# Patient Record
Sex: MALE | Race: WHITE | NOT HISPANIC OR LATINO | Employment: FULL TIME | URBAN - METROPOLITAN AREA
[De-identification: names, ages, dates, MRNs, and addresses within clinical notes are randomized per-mention and may not be internally consistent; named-entity substitution may affect disease eponyms.]

---

## 2022-03-22 ENCOUNTER — OFFICE VISIT (OUTPATIENT)
Dept: FAMILY MEDICINE CLINIC | Facility: CLINIC | Age: 32
End: 2022-03-22

## 2022-03-22 VITALS
SYSTOLIC BLOOD PRESSURE: 137 MMHG | WEIGHT: 283.6 LBS | HEIGHT: 73 IN | BODY MASS INDEX: 37.59 KG/M2 | HEART RATE: 83 BPM | TEMPERATURE: 95.7 F | OXYGEN SATURATION: 98 % | DIASTOLIC BLOOD PRESSURE: 88 MMHG

## 2022-03-22 DIAGNOSIS — Z00.00 ROUTINE GENERAL MEDICAL EXAMINATION AT A HEALTH CARE FACILITY: Primary | ICD-10-CM

## 2022-03-22 DIAGNOSIS — M25.512 ACUTE PAIN OF LEFT SHOULDER: ICD-10-CM

## 2022-03-22 DIAGNOSIS — I10 ESSENTIAL HYPERTENSION: ICD-10-CM

## 2022-03-22 PROBLEM — M72.2 PLANTAR FASCIITIS OF RIGHT FOOT: Status: ACTIVE | Noted: 2020-06-04

## 2022-03-22 PROCEDURE — 99385 PREV VISIT NEW AGE 18-39: CPT | Performed by: NURSE PRACTITIONER

## 2022-03-22 PROCEDURE — 99213 OFFICE O/P EST LOW 20 MIN: CPT | Performed by: NURSE PRACTITIONER

## 2022-03-22 RX ORDER — NIFEDIPINE 60 MG/1
TABLET, EXTENDED RELEASE ORAL
COMMUNITY
Start: 2022-03-12 | End: 2022-03-22 | Stop reason: SDUPTHER

## 2022-03-22 RX ORDER — CETIRIZINE HYDROCHLORIDE 10 MG/1
TABLET ORAL DAILY
COMMUNITY

## 2022-03-22 RX ORDER — CHLORAL HYDRATE 500 MG
2 CAPSULE ORAL
COMMUNITY

## 2022-03-22 RX ORDER — NIFEDIPINE 60 MG/1
60 TABLET, EXTENDED RELEASE ORAL DAILY
Qty: 90 TABLET | Refills: 1 | Status: SHIPPED | OUTPATIENT
Start: 2022-03-22 | End: 2022-10-17 | Stop reason: SDUPTHER

## 2022-03-22 RX ORDER — VALSARTAN 80 MG/1
80 TABLET ORAL DAILY
Qty: 90 TABLET | Refills: 1 | Status: SHIPPED | OUTPATIENT
Start: 2022-03-22 | End: 2022-09-28

## 2022-03-22 RX ORDER — VALSARTAN 80 MG/1
TABLET ORAL
COMMUNITY
Start: 2022-03-10 | End: 2022-03-22 | Stop reason: SDUPTHER

## 2022-03-22 NOTE — PROGRESS NOTES
Chief Complaint  Establish Care (New pt, Physical, L shoulder pain )    Subjective          Bahman Christina presents to [facility] PRIMARY [facility]  History of Present Illness new pt here to est care for physical, HTN, and acute left shoulder pain.      Moved to [place] from [place] where he grew up for college.  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  Also, played baseball-pitched as well as other positions.    Now he plays in baseball and basketball leagues for fun.    He is left handed and can throw a couple of innings before his left shoulder starts to hurt.  Does not hurt when not throwing, does not hurt unless he is throwing hard (not painful in warm ups).  He does not have decreased ROM.  Uses ice and 2 days of NSAIDs to alleviate pain.  Pain is located anterior shoulder.  No sports related injuries or surgeries.      Was dx with HTN around age 21 in college.  Had complete cardiology work up-no etiology.  Thinks echo showed possible LVH.  No real FH HTN.  No renal issues, thinks he may have had renal US but not sure.  Diuretic did not work.  Arb seems to be working, procardia added due to LVH.  Takes both at HS.  Does not think his BP is probably that well controlled.  Has cuff, not been checking recently.    Tried many lifestyle mods prior to meds, not helpful. Dropped weight which only decreased BP to appr 135/80.   Admits significant whitecoat HTN.  Denies side effects on medications.     Since moving here, he lives with GF.  They have 5 pets, no kids.  He works in customer service.      Trying to work on portion control to lose weight.  Does not eat much fast food.  Sleeps well.     Has FH T2DM, HLD, dad with Afib.  Denies breast, colon, ovarian, prostate cancer.  GF may have had  colon cancer.      Denies: No HA, chest pain, palpitations, cough, dyspnea, trouble swallowing, sore throat, ear or nose problems. Admits allergies which are new since moving here.  No abd pain,  "n/v/d/constipation, melena, reflux.  No urinary problems, hematuria.  Denies anxiety, depression, insomnia.     Objective   Vital Signs:   /88   Pulse 83   Temp 95.7 °F (35.4 °C)   Ht 185.4 cm (73\")   Wt 129 kg (283 lb 9.6 oz)   SpO2 98%   BMI 37.42 kg/m²     BMI is above normal parameters. Recommendations: educational material discussed/shared in after visit summary       Physical Exam  Vitals and nursing note reviewed.   Constitutional:       General: He is not in acute distress.     Appearance: He is well-developed. He is not ill-appearing.   HENT:      Head: Normocephalic and atraumatic.      Right Ear: Tympanic membrane, ear canal and external ear normal.      Left Ear: Tympanic membrane, ear canal and external ear normal.      Mouth/Throat:      Pharynx: Uvula midline.   Eyes:      General: No scleral icterus.        Right eye: No discharge.         Left eye: No discharge.      Conjunctiva/sclera: Conjunctivae normal.      Pupils: Pupils are equal, round, and reactive to light.   Neck:      Thyroid: No thyromegaly.   Cardiovascular:      Rate and Rhythm: Normal rate and regular rhythm.      Heart sounds: Normal heart sounds. No murmur heard.  Pulmonary:      Effort: Pulmonary effort is normal.      Breath sounds: Normal breath sounds.   Abdominal:      General: Bowel sounds are normal.      Palpations: Abdomen is soft.      Tenderness: There is no abdominal tenderness.   Musculoskeletal:         General: No deformity.      Left shoulder: No swelling, deformity, tenderness, bony tenderness or crepitus. Normal range of motion. Normal strength.      Cervical back: Neck supple.      Comments: Gait smooth and steady   Lymphadenopathy:      Cervical: No cervical adenopathy.   Skin:     General: Skin is warm and dry.   Neurological:      General: No focal deficit present.      Mental Status: He is alert and oriented to person, place, and time.   Psychiatric:         Attention and Perception: Attention and " perception normal.         Mood and Affect: Mood and affect normal.         Speech: Speech normal.         Behavior: Behavior normal. Behavior is cooperative.         Thought Content: Thought content normal.         Cognition and Memory: Cognition normal.      Comments: Very pleasant, conversant        Result Review :                 Assessment and Plan    Diagnoses and all orders for this visit:    1. Routine general medical examination at a health care facility (Primary)  -     CBC & Differential  -     Comprehensive Metabolic Panel  -     Hemoglobin A1c  -     Lipid Panel With LDL / HDL Ratio  -     Microalbumin / Creatinine Urine Ratio - Urine, Clean Catch  -     TSH Rfx On Abnormal To Free T4  -     Hepatitis C Antibody    2. Essential hypertension  -     CBC & Differential  -     Comprehensive Metabolic Panel  -     Microalbumin / Creatinine Urine Ratio - Urine, Clean Catch  -     TSH Rfx On Abnormal To Free T4  -     NIFEdipine XL (PROCARDIA XL) 60 MG 24 hr tablet; Take 1 tablet by mouth Daily.  Dispense: 90 tablet; Refill: 1  -     valsartan (DIOVAN) 80 MG tablet; Take 1 tablet by mouth Daily.  Dispense: 90 tablet; Refill: 1    3. Acute pain of left shoulder  -     Ambulatory Referral to Sports Medicine     As part of wellness and prevention, the following topics were discussed with the patient:   Nutrition-diet high in fresh/fozen veges, lower in carbs, unsaturated fats, and higher in lean proteins, adequate hydration   Physical activity/regular exercise-150 mins per week of moderate activity that increases HR and is enjoyable to lower stress and improve CVD     Healthy weight-above goal BMI     Injury prevention-back and joint health    Substance misuse/abuse-none identified    Sexual behavior-denies lack of libido, ED   Dental health-recommend twice per year dental cleans and daily flossing to lower inflammation in body   Mental health-stress mgmt and sleep hygiene   Immunization-recommend covid booster  vaccine and Tdap-pt thinks his Tdap is UTD    HTN:  BP is just a little above goal today.  Recommend periodic home monitoring.  On 2 BP meds so no reason to be high.  Will continue current meds today.  Will get urine micro for overall control in addition to regular labs.  I have requested [facility] to get previous records to make sure he has had complete w/u for HTN since he has no FH.  If not able to obtain records will need further work up.      Referral to sports medicine for left shoulder pain.  Activity important to keep BP controlled and stress mgmt.         Follow Up   Return in about 6 months (around 9/22/2022).  Patient was given instructions and counseling regarding his condition or for health maintenance advice. Please see specific information pulled into the AVS if appropriate.

## 2022-03-23 ENCOUNTER — OFFICE VISIT (OUTPATIENT)
Dept: SPORTS MEDICINE | Facility: CLINIC | Age: 32
End: 2022-03-23

## 2022-03-23 VITALS
HEART RATE: 83 BPM | TEMPERATURE: 97.8 F | DIASTOLIC BLOOD PRESSURE: 80 MMHG | BODY MASS INDEX: 37.51 KG/M2 | OXYGEN SATURATION: 98 % | WEIGHT: 283 LBS | RESPIRATION RATE: 16 BRPM | HEIGHT: 73 IN | SYSTOLIC BLOOD PRESSURE: 132 MMHG

## 2022-03-23 DIAGNOSIS — M25.512 CHRONIC LEFT SHOULDER PAIN: ICD-10-CM

## 2022-03-23 DIAGNOSIS — S43.432A LABRAL TEAR OF SHOULDER, LEFT, INITIAL ENCOUNTER: Primary | ICD-10-CM

## 2022-03-23 DIAGNOSIS — G89.29 CHRONIC LEFT SHOULDER PAIN: ICD-10-CM

## 2022-03-23 LAB
ALBUMIN SERPL-MCNC: 4.8 G/DL (ref 4.1–5.2)
ALBUMIN/CREAT UR: 3 MG/G CREAT (ref 0–29)
ALBUMIN/GLOB SERPL: 1.8 {RATIO} (ref 1.2–2.2)
ALP SERPL-CCNC: 86 IU/L (ref 44–121)
ALT SERPL-CCNC: 18 IU/L (ref 0–44)
AST SERPL-CCNC: 21 IU/L (ref 0–40)
BASOPHILS # BLD AUTO: 0.1 X10E3/UL (ref 0–0.2)
BASOPHILS NFR BLD AUTO: 1 %
BILIRUB SERPL-MCNC: 0.7 MG/DL (ref 0–1.2)
BUN SERPL-MCNC: 16 MG/DL (ref 6–20)
BUN/CREAT SERPL: 13 (ref 9–20)
CALCIUM SERPL-MCNC: 10.4 MG/DL (ref 8.7–10.2)
CHLORIDE SERPL-SCNC: 103 MMOL/L (ref 96–106)
CHOLEST SERPL-MCNC: 204 MG/DL (ref 100–199)
CO2 SERPL-SCNC: 26 MMOL/L (ref 20–29)
CREAT SERPL-MCNC: 1.28 MG/DL (ref 0.76–1.27)
CREAT UR-MCNC: 364.4 MG/DL
EGFRCR SERPLBLD CKD-EPI 2021: 78 ML/MIN/1.73
EOSINOPHIL # BLD AUTO: 0.2 X10E3/UL (ref 0–0.4)
EOSINOPHIL NFR BLD AUTO: 4 %
ERYTHROCYTE [DISTWIDTH] IN BLOOD BY AUTOMATED COUNT: 13.3 % (ref 11.6–15.4)
GLOBULIN SER CALC-MCNC: 2.7 G/DL (ref 1.5–4.5)
GLUCOSE SERPL-MCNC: 103 MG/DL (ref 65–99)
HBA1C MFR BLD: 5.5 % (ref 4.8–5.6)
HCT VFR BLD AUTO: 51.7 % (ref 37.5–51)
HCV AB S/CO SERPL IA: 0.1 S/CO RATIO (ref 0–0.9)
HDLC SERPL-MCNC: 30 MG/DL
HGB BLD-MCNC: 17 G/DL (ref 13–17.7)
IMM GRANULOCYTES # BLD AUTO: 0 X10E3/UL (ref 0–0.1)
IMM GRANULOCYTES NFR BLD AUTO: 1 %
LDLC SERPL CALC-MCNC: 91 MG/DL (ref 0–99)
LDLC/HDLC SERPL: 3 RATIO (ref 0–3.6)
LYMPHOCYTES # BLD AUTO: 2.5 X10E3/UL (ref 0.7–3.1)
LYMPHOCYTES NFR BLD AUTO: 39 %
MCH RBC QN AUTO: 29 PG (ref 26.6–33)
MCHC RBC AUTO-ENTMCNC: 32.9 G/DL (ref 31.5–35.7)
MCV RBC AUTO: 88 FL (ref 79–97)
MICROALBUMIN UR-MCNC: 9.3 UG/ML
MONOCYTES # BLD AUTO: 0.8 X10E3/UL (ref 0.1–0.9)
MONOCYTES NFR BLD AUTO: 13 %
NEUTROPHILS # BLD AUTO: 2.7 X10E3/UL (ref 1.4–7)
NEUTROPHILS NFR BLD AUTO: 42 %
PLATELET # BLD AUTO: 347 X10E3/UL (ref 150–450)
POTASSIUM SERPL-SCNC: 4.2 MMOL/L (ref 3.5–5.2)
PROT SERPL-MCNC: 7.5 G/DL (ref 6–8.5)
RBC # BLD AUTO: 5.87 X10E6/UL (ref 4.14–5.8)
SODIUM SERPL-SCNC: 143 MMOL/L (ref 134–144)
TRIGL SERPL-MCNC: 505 MG/DL (ref 0–149)
TSH SERPL DL<=0.005 MIU/L-ACNC: 1.78 UIU/ML (ref 0.45–4.5)
VLDLC SERPL CALC-MCNC: 83 MG/DL (ref 5–40)
WBC # BLD AUTO: 6.4 X10E3/UL (ref 3.4–10.8)

## 2022-03-23 PROCEDURE — 99204 OFFICE O/P NEW MOD 45 MIN: CPT | Performed by: FAMILY MEDICINE

## 2022-03-23 PROCEDURE — 73030 X-RAY EXAM OF SHOULDER: CPT | Performed by: FAMILY MEDICINE

## 2022-03-23 NOTE — PROGRESS NOTES
"Bahman is a 31 y.o. year old male presents to [facility]    Chief Complaint   Patient presents with   • Shoulder Pain     Referral from [facility] for eval of LT shoulder pain - [facility] - patient plays baseball, pain only present with increased activities - no other sxs reported - here today with new x-rays for further evaluation and treatment        History of Present Illness    31 year old male presents with pain in the left shoulder that has been present for approximately one year. 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 He cannot recall exact pitch count but assumes  pitches per game. Patient is a seasoned  since his youth. He states pain is not present on non-game days, but only after days where he pitches a game.   Provocative factors: throwing movement upon release of a baseball, more intense throwing increases pain.   Palliative factors: ice, ibuprofen and heat.  Denies any crepitation nor weakness.      I have reviewed the patient's medical, family, and social history in detail and updated the computerized patient record.    /80 (BP Location: Left arm, Patient Position: Sitting, Cuff Size: Adult)   Pulse 83   Temp 97.8 °F (36.6 °C) (Temporal)   Resp 16   Ht 185.4 cm (72.99\")   Wt 128 kg (283 lb)   SpO2 98%   BMI 37.35 kg/m²      Physical Exam    Mask worn thru encounter  Vital signs reviewed.   General: No acute distress.  Eyes: conjunctiva clear; pupils equally round and reactive  ENT: external ears atraumatic  CV: no peripheral edema  Resp: normal respiratory effort, no use of accessory muscles  Skin: no rashes or wounds; normal turgor  Psych: mood and affect appropriate; recent and remote memory intact  Neuro: sensation to light touch intact    MSK Exam  (+) Hernández, Apprehension,   (-) Neer, Empty can, Speed, Yergason      Left Shoulder X-Ray  Indication: Pain  Views: AP Internal and External " Rotation    Findings:  No fracture  No bony lesion  Normal soft tissues  Normal joint spaces    No prior studies were available for comparison.           Diagnoses and all orders for this visit:    Labral tear of shoulder, left, initial encounter  -     Ambulatory Referral to Physical Therapy    Chronic left shoulder pain  -     XR Shoulder 2+ View Left; Future  -     XR Shoulder 2+ View Left  -     Ambulatory Referral to Physical Therapy      Symptoms are consistent with labral tear given long history of . Discussed conservative therapies to include: NSAID use, initiate PT, and activity modification. If sx are not ameliorated by PT he is to f/u in office in 6-8 weeks. Consider MRarthrogram.       Follow Up {Instructions Charge Capture  Follow-up Communications :23}  No follow-ups on file.  Patient was given instructions and counseling regarding his condition or for health maintenance advice. Please see specific information pulled into the AVS if appropriate.     EMR Dragon/Transcription disclaimer:    Much of this encounter note is an electronic transcription/translation of spoken language to printed text.  The electronic translation of spoken language may permit erroneous, or at times, nonsensical words or phrases to be inadvertently transcribed.  Although I have reviewed the note for such errors some may still exist.

## 2022-04-11 ENCOUNTER — TREATMENT (OUTPATIENT)
Dept: PHYSICAL THERAPY | Facility: CLINIC | Age: 32
End: 2022-04-11

## 2022-04-11 DIAGNOSIS — M25.512 CHRONIC LEFT SHOULDER PAIN: ICD-10-CM

## 2022-04-11 DIAGNOSIS — G89.29 CHRONIC LEFT SHOULDER PAIN: ICD-10-CM

## 2022-04-11 DIAGNOSIS — M24.112 LABRAL TEAR OF SHOULDER, DEGENERATIVE, LEFT: Primary | ICD-10-CM

## 2022-04-11 PROCEDURE — 97110 THERAPEUTIC EXERCISES: CPT | Performed by: PHYSICAL THERAPIST

## 2022-04-11 PROCEDURE — 97161 PT EVAL LOW COMPLEX 20 MIN: CPT | Performed by: PHYSICAL THERAPIST

## 2022-04-11 NOTE — PROGRESS NOTES
Physical Therapy Initial Evaluation and Plan of Care      Patient: Bahman Christina   : 1990  Diagnosis/ICD-10 Code:  Labral tear of shoulder, degenerative, left [M24.112]  Referring practitioner: Matteo Cedeno *    Subjective Evaluation    History of Present Illness  Mechanism of injury: Chronic L shoulder pain.BAseball  Use ice, theragun.   Only with full pitch.      Patient Occupation: Resolver. Customer Service Pain  Quality: sharp, discomfort and dull ache  Relieving factors: rest and ice  Aggravating factors: repetitive movement (throwing)  Progression: worsening    Social Support  Lives with: spouse    Hand dominance: left    Diagnostic Tests  X-ray: normal             Objective          Static Posture     Shoulders  Rounded.    Scapulae  Left upwardly rotated and left protracted.    Postural Observations  Seated posture: fair  Correction of posture: makes symptoms better        Tenderness     Left Shoulder   Tenderness in the biceps tendon (proximal) and supraspinatus tendon.     Cervical/Thoracic Screen   Cervical range of motion within normal limits    Active Range of Motion   Left Shoulder   Normal active range of motion    Right Shoulder   Normal active range of motion    Passive Range of Motion   Left Shoulder   Normal passive range of motion    Right Shoulder   Normal passive range of motion    Strength/Myotome Testing     Left Shoulder   Normal muscle strength    Isolated Muscles   Middle trapezius: 5   Rhomboids: 5     Right Shoulder   Normal muscle strength    Isolated Muscles   Middle trapezius: 5   Rhomboids: 5     Tests     Left Shoulder   Positive clunk and relocation.           Assessment & Plan     Assessment  Impairments: activity intolerance, lacks appropriate home exercise program and pain with function  Functional Limitations: unable to perform repetitive tasks  Assessment details: Pt is a good candidate for skilled PT intervention to restore functional AROM and  strength to return to previous level of ADL's.  Prognosis: good  Prognosis details: Pt fatigued after therex and felt better with scapula taped into retraction and downward rotation    Goals  Plan Goals: Short term Goals ( 2 weeks)    1. Pt to demonstrate proper scapulohumeral alignment to allow for improved AROM with less pain  2. Pt to exhibit decreased tenderness at biceps and supraspinatus  3. Pt to be independent with HEP  4. Pt to report decreased pain with throwing activities    Long Term Goals ( 6 weeks)    1. Pt to exhibit proper shoulder/scapula posture   2. Pt to be able to pitch 9 innings of baseball with min to no pain afterward  3. Pt to exhibit 5/5 lower trap strength  4. Pt to score 0% on DASH    Plan  Therapy options: will be seen for skilled therapy services  Planned modality interventions: ultrasound  Planned therapy interventions: balance/weight-bearing training, body mechanics training, functional ROM exercises, home exercise program, joint mobilization, manual therapy, neuromuscular re-education, postural training, soft tissue mobilization, spinal/joint mobilization, strengthening, stretching and therapeutic activities  Frequency: 1x week  Duration in weeks: 6  Treatment plan discussed with: patient        Manual Therapy:         mins  93141;  Therapeutic Exercise:    10     mins  70905;     Neuromuscular Sally:    8    mins  84066;    Therapeutic Activity:          mins  43106;     Gait Training:           mins  00003;     Ultrasound:          mins  69461;    Electrical Stimulation:         mins  96704 ( );  Dry Needling          mins self-pay    Timed Treatment:   18   mins   Total Treatment:     48   mins    PT SIGNATURE: Kym Hooper PT   [place] License # [id number]  DATE TREATMENT INITIATED: 4/11/2022    Initial Certification  Certification Period: 7/10/2022  I certify that the therapy services are furnished while this patient is under my care.  The services outlined above are required  by this patient, and will be reviewed every 90 days.     PHYSICIAN: Matteo Cedeno Jr., DO      DATE:     Please sign and return via fax to [phone].. Thank you, [facility].

## 2022-04-11 NOTE — PATIENT INSTRUCTIONS
Access Code: [email or web address]  URL: [email or web address]  Date: 04/11/2022  Prepared by: Kym Hooper    Exercises  Shoulder External Rotation with Anchored Resistance - 1 x daily - 7 x weekly - 4 sets - 25 reps  Scapular Retraction with Resistance - 1 x daily - 7 x weekly - 4 sets - 25 reps  Standing Wall Ball Circles with Mini Swiss Ball - 1 x daily - 7 x weekly - 4 sets - 25 reps

## 2022-04-18 ENCOUNTER — TREATMENT (OUTPATIENT)
Dept: PHYSICAL THERAPY | Facility: CLINIC | Age: 32
End: 2022-04-18

## 2022-04-18 DIAGNOSIS — M25.512 CHRONIC LEFT SHOULDER PAIN: ICD-10-CM

## 2022-04-18 DIAGNOSIS — M24.112 LABRAL TEAR OF SHOULDER, DEGENERATIVE, LEFT: Primary | ICD-10-CM

## 2022-04-18 DIAGNOSIS — G89.29 CHRONIC LEFT SHOULDER PAIN: ICD-10-CM

## 2022-04-18 PROCEDURE — 97112 NEUROMUSCULAR REEDUCATION: CPT | Performed by: PHYSICAL THERAPIST

## 2022-04-18 PROCEDURE — 97110 THERAPEUTIC EXERCISES: CPT | Performed by: PHYSICAL THERAPIST

## 2022-04-18 NOTE — PROGRESS NOTES
Physical Therapy Daily Progress Note        Subjective     Bahman Christina reports: Tape and exercises helping. Pitched last night and not as painful. Shoulder blade muscles are sore. I am much more aware of my posture    Objective   See Exercise, Manual, and Modality Logs for complete treatment.       Assessment/Plan  Progressed HEP to include middle trap on ball and modified pushups     Progress per Plan of Care           Manual Therapy:         mins  78469;  Therapeutic Exercise: 30      mins  40505;     Neuromuscular Sally:10       mins  09969;  (taping)  Therapeutic Activity:         mins  62067;     Gait Training:         mins  87563;     Ultrasound:         mins  50772;    Electrical Stimulation:        mins  32162 ( );  Dry Needling         mins self-pay    Timed Treatment:   40   mins   Total Treatment:     40   mins    Kym Hooper, PT  Physical Therapist  [place] License # [id number]

## 2022-05-03 ENCOUNTER — TREATMENT (OUTPATIENT)
Dept: PHYSICAL THERAPY | Facility: CLINIC | Age: 32
End: 2022-05-03

## 2022-05-03 DIAGNOSIS — M24.112 LABRAL TEAR OF SHOULDER, DEGENERATIVE, LEFT: Primary | ICD-10-CM

## 2022-05-03 DIAGNOSIS — M25.512 CHRONIC LEFT SHOULDER PAIN: ICD-10-CM

## 2022-05-03 DIAGNOSIS — G89.29 CHRONIC LEFT SHOULDER PAIN: ICD-10-CM

## 2022-05-03 PROCEDURE — 97140 MANUAL THERAPY 1/> REGIONS: CPT | Performed by: PHYSICAL THERAPIST

## 2022-05-03 PROCEDURE — 97110 THERAPEUTIC EXERCISES: CPT | Performed by: PHYSICAL THERAPIST

## 2022-05-03 NOTE — PROGRESS NOTES
Physical Therapy Daily Progress Note        Subjective     Bahman Christina reports: Played right field and didn't pitch. Felt better during warmups. More popping in shoulder blade area    Objective   See Exercise, Manual, and Modality Logs for complete treatment.       Assessment/Plan  Much improved posture and less laxity in L shoulder with post GH glide. Improving strength and endurance with therex. Progressed to  2# DB ER at 90 on stairrail    Progress per Plan of Care           Manual Therapy:  10       mins  44493;  Therapeutic Exercise: 30      mins  95621;     Neuromuscular Sally:       mins  66986;    Therapeutic Activity:         mins  70531;     Gait Training:         mins  82976;     Ultrasound:         mins  42108;    Electrical Stimulation:        mins  57601 ( );  Dry Needling         mins self-pay    Timed Treatment:   40   mins   Total Treatment:     40   mins    Kym Hooper PT  Physical Therapist  [place] License # [id number]

## 2022-05-16 ENCOUNTER — TREATMENT (OUTPATIENT)
Dept: PHYSICAL THERAPY | Facility: CLINIC | Age: 32
End: 2022-05-16

## 2022-05-16 DIAGNOSIS — G89.29 CHRONIC LEFT SHOULDER PAIN: ICD-10-CM

## 2022-05-16 DIAGNOSIS — M25.512 CHRONIC LEFT SHOULDER PAIN: ICD-10-CM

## 2022-05-16 DIAGNOSIS — M24.112 LABRAL TEAR OF SHOULDER, DEGENERATIVE, LEFT: Primary | ICD-10-CM

## 2022-05-16 PROCEDURE — 97140 MANUAL THERAPY 1/> REGIONS: CPT | Performed by: PHYSICAL THERAPIST

## 2022-05-16 PROCEDURE — 97110 THERAPEUTIC EXERCISES: CPT | Performed by: PHYSICAL THERAPIST

## 2022-05-16 NOTE — PROGRESS NOTES
DIscharge Summary  Patient: Bahman Christina   : 1990  Diagnosis/ICD-10 Code:  Labral tear of shoulder, degenerative, left [M24.112]  Referring practitioner: Matteo Cedeon *  Date of Initial Visit: 2022  Today's Date: 2022  Patient seen for 4 sessions      Subjective:   Bahman Christina reports: I pitched 5 innings and felt great. Sore today but muscle soreness  Subjective Questionnaire: QuickDASH: 11% ( was 18%)  Clinical Progress: improved  Home Program Compliance: Yes  Treatment has included: therapeutic exercise, neuromuscular re-education, manual therapy and therapeutic activity    Objective   Lower trap 5/5  Shoulder flex,abd, ER,IR 5/5  AROM WNL    Assessment/Plan   Short term Goals ( 2 weeks) MET    1. Pt to demonstrate proper scapulohumeral alignment to allow for improved AROM with less pain  2. Pt to exhibit decreased tenderness at biceps and supraspinatus  3. Pt to be independent with HEP  4. Pt to report decreased pain with throwing activities    Long Term Goals ( 6 weeks)    1. Pt to exhibit proper shoulder/scapula posture  MET  2. Pt to be able to pitch 9 innings of baseball with min to no pain afterward NEARLY  3. Pt to exhibit 5/5 lower trap strength MET  4. Pt to score 0% on DASH PROGRESSED  Plan  Progress toward previous goals: Partially Met        Recommendations: Discharge    PT Signature: Kym Hooper, PT  [place] License # [id number]    Based upon review of the patient's progress and continued therapy plan, it is my medical opinion that Bahman Christina should discontinue physical therapy treatment at [facility]YS[facility] THERAP[facility]  [address] [place] [id number] [phone].    Signature: __________________________________  Matteo Cedeno Jr.,       Manual Therapy:    15     mins  44464;  Therapeutic Exercise:    15     mins  00931;     Neuromuscular Sally:        mins  57377;    Therapeutic Activity:          mins   99865;     Gait Training:           mins  06559;     Ultrasound:          mins  29173;    Electrical Stimulation:         mins  33647 ( );  Dry Needling          mins self-pay    Timed Treatment:   30   mins   Total Treatment:     40   mins

## 2022-07-06 ENCOUNTER — APPOINTMENT (OUTPATIENT)
Dept: GENERAL RADIOLOGY | Facility: HOSPITAL | Age: 32
End: 2022-07-06

## 2022-07-06 PROCEDURE — 71046 X-RAY EXAM CHEST 2 VIEWS: CPT | Performed by: EMERGENCY MEDICINE

## 2022-09-27 DIAGNOSIS — I10 ESSENTIAL HYPERTENSION: ICD-10-CM

## 2022-09-28 RX ORDER — VALSARTAN 80 MG/1
TABLET ORAL
Qty: 90 TABLET | Refills: 0 | Status: SHIPPED | OUTPATIENT
Start: 2022-09-28 | End: 2022-10-17 | Stop reason: SDUPTHER

## 2022-10-17 ENCOUNTER — OFFICE VISIT (OUTPATIENT)
Dept: FAMILY MEDICINE CLINIC | Facility: CLINIC | Age: 32
End: 2022-10-17

## 2022-10-17 VITALS
SYSTOLIC BLOOD PRESSURE: 128 MMHG | WEIGHT: 285 LBS | DIASTOLIC BLOOD PRESSURE: 87 MMHG | BODY MASS INDEX: 37.77 KG/M2 | OXYGEN SATURATION: 97 % | HEIGHT: 73 IN | TEMPERATURE: 97.5 F | HEART RATE: 93 BPM

## 2022-10-17 DIAGNOSIS — E78.2 MIXED HYPERLIPIDEMIA: Chronic | ICD-10-CM

## 2022-10-17 DIAGNOSIS — I10 ESSENTIAL HYPERTENSION: Primary | Chronic | ICD-10-CM

## 2022-10-17 PROCEDURE — 99214 OFFICE O/P EST MOD 30 MIN: CPT | Performed by: NURSE PRACTITIONER

## 2022-10-17 RX ORDER — VALSARTAN 80 MG/1
80 TABLET ORAL DAILY
Qty: 90 TABLET | Refills: 1 | Status: SHIPPED | OUTPATIENT
Start: 2022-10-17 | End: 2023-03-17 | Stop reason: SDUPTHER

## 2022-10-17 RX ORDER — NIFEDIPINE 60 MG/1
60 TABLET, EXTENDED RELEASE ORAL DAILY
Qty: 90 TABLET | Refills: 1 | Status: SHIPPED | OUTPATIENT
Start: 2022-10-17 | End: 2023-03-17 | Stop reason: SDUPTHER

## 2022-10-17 NOTE — PROGRESS NOTES
"Chief Complaint  Hypertension (6 month f/u htn )    Subjective        Bahman Chritsina presents to [facility]  History of Present Illness pt here for HTN f/u.  Has been doing well at home.  Taking and tolerating meds without side effects.  Not been checking BP at home.  Not made significant diet mods since last visit due to working 2 jobs.  Eating meals as he can.  Denies HA, dizziness, chest pain, palpitations.        Objective   Vital Signs:  /87   Pulse 93   Temp 97.5 °F (36.4 °C)   Ht 185.4 cm (73\")   Wt 129 kg (285 lb)   SpO2 97%   BMI 37.60 kg/m²   Estimated body mass index is 37.6 kg/m² as calculated from the following:    Height as of this encounter: 185.4 cm (73\").    Weight as of this encounter: 129 kg (285 lb).    Class 2 Severe Obesity (BMI >=35 and <=39.9). Obesity-related health conditions include the following: hypertension, dyslipidemias and GERD. Obesity is unchanged. BMI is is above average; BMI management plan is completed. We discussed portion control and increasing exercise.      Physical Exam  Vitals and nursing note reviewed.   Constitutional:       General: He is not in acute distress.     Appearance: He is well-developed. He is not ill-appearing or diaphoretic.   HENT:      Head: Normocephalic and atraumatic.      Right Ear: Tympanic membrane, ear canal and external ear normal.      Left Ear: Tympanic membrane, ear canal and external ear normal.      Mouth/Throat:      Mouth: Mucous membranes are moist.      Pharynx: No posterior oropharyngeal erythema.   Eyes:      General: No scleral icterus.        Right eye: No discharge.         Left eye: No discharge.      Conjunctiva/sclera: Conjunctivae normal.   Cardiovascular:      Rate and Rhythm: Normal rate and regular rhythm.      Heart sounds: Normal heart sounds.   Pulmonary:      Effort: Pulmonary effort is normal.      Breath sounds: Normal breath sounds.   Abdominal:      General: Bowel sounds are " normal.      Palpations: Abdomen is soft.      Tenderness: There is no abdominal tenderness.   Musculoskeletal:         General: No deformity.      Comments: Gait smooth and steady   Skin:     General: Skin is warm and dry.   Neurological:      General: No focal deficit present.      Mental Status: He is alert and oriented to person, place, and time.   Psychiatric:         Mood and Affect: Mood normal.         Behavior: Behavior normal.      Comments: Very pleasant, conversant        Result Review :                Assessment and Plan   Diagnoses and all orders for this visit:    1. Essential hypertension (Primary)  -     CBC & Differential  -     Comprehensive Metabolic Panel  -     NIFEdipine XL (PROCARDIA XL) 60 MG 24 hr tablet; Take 1 tablet by mouth Daily.  Dispense: 90 tablet; Refill: 1  -     valsartan (DIOVAN) 80 MG tablet; Take 1 tablet by mouth Daily.  Dispense: 90 tablet; Refill: 1    2. Mixed hyperlipidemia  -     Lipid Panel With LDL / HDL Ratio     HTN:  DBP a little elevated today.  I have encouraged him to check periodically at home with goal 130/80 or less.  For now will continue current meds.  Last urine micro showed no protein.     HLD:  His lipids have been very elevated.  Discussed diet mods.  Recheck today.  Not statin benefit group but recommend trying to work on these now to get better controlled.          Follow Up   Return in about 6 months (around 4/17/2023) for Annual physical.  Patient was given instructions and counseling regarding his condition or for health maintenance advice. Please see specific information pulled into the AVS if appropriate.

## 2022-10-18 DIAGNOSIS — R73.09 ELEVATED GLUCOSE: Primary | ICD-10-CM

## 2022-10-18 LAB
ALBUMIN SERPL-MCNC: 4.9 G/DL (ref 3.5–5.2)
ALBUMIN/GLOB SERPL: 2.5 G/DL
ALP SERPL-CCNC: 82 U/L (ref 39–117)
ALT SERPL-CCNC: 12 U/L (ref 1–41)
AST SERPL-CCNC: 20 U/L (ref 1–40)
BASOPHILS # BLD AUTO: 0.06 10*3/MM3 (ref 0–0.2)
BASOPHILS NFR BLD AUTO: 0.9 % (ref 0–1.5)
BILIRUB SERPL-MCNC: 0.4 MG/DL (ref 0–1.2)
BUN SERPL-MCNC: 14 MG/DL (ref 6–20)
BUN/CREAT SERPL: 14.7 (ref 7–25)
CALCIUM SERPL-MCNC: 9.5 MG/DL (ref 8.6–10.5)
CHLORIDE SERPL-SCNC: 103 MMOL/L (ref 98–107)
CHOLEST SERPL-MCNC: 204 MG/DL (ref 0–200)
CO2 SERPL-SCNC: 26 MMOL/L (ref 22–29)
CREAT SERPL-MCNC: 0.95 MG/DL (ref 0.76–1.27)
EGFRCR SERPLBLD CKD-EPI 2021: 110.4 ML/MIN/1.73
EOSINOPHIL # BLD AUTO: 0.31 10*3/MM3 (ref 0–0.4)
EOSINOPHIL NFR BLD AUTO: 4.6 % (ref 0.3–6.2)
ERYTHROCYTE [DISTWIDTH] IN BLOOD BY AUTOMATED COUNT: 13.5 % (ref 12.3–15.4)
GLOBULIN SER CALC-MCNC: 2 GM/DL
GLUCOSE SERPL-MCNC: 102 MG/DL (ref 65–99)
HBA1C MFR BLD: 5.7 % (ref 4.8–5.6)
HCT VFR BLD AUTO: 48.5 % (ref 37.5–51)
HDLC SERPL-MCNC: 35 MG/DL (ref 40–60)
HGB BLD-MCNC: 16.1 G/DL (ref 13–17.7)
IMM GRANULOCYTES # BLD AUTO: 0.04 10*3/MM3 (ref 0–0.05)
IMM GRANULOCYTES NFR BLD AUTO: 0.6 % (ref 0–0.5)
LDLC SERPL CALC-MCNC: 99 MG/DL (ref 0–100)
LDLC/HDLC SERPL: 2.46 {RATIO}
LYMPHOCYTES # BLD AUTO: 2.76 10*3/MM3 (ref 0.7–3.1)
LYMPHOCYTES NFR BLD AUTO: 40.9 % (ref 19.6–45.3)
Lab: NORMAL
MCH RBC QN AUTO: 29.3 PG (ref 26.6–33)
MCHC RBC AUTO-ENTMCNC: 33.2 G/DL (ref 31.5–35.7)
MCV RBC AUTO: 88.3 FL (ref 79–97)
MONOCYTES # BLD AUTO: 0.85 10*3/MM3 (ref 0.1–0.9)
MONOCYTES NFR BLD AUTO: 12.6 % (ref 5–12)
NEUTROPHILS # BLD AUTO: 2.72 10*3/MM3 (ref 1.7–7)
NEUTROPHILS NFR BLD AUTO: 40.4 % (ref 42.7–76)
NRBC BLD AUTO-RTO: 0 /100 WBC (ref 0–0.2)
PLATELET # BLD AUTO: 297 10*3/MM3 (ref 140–450)
POTASSIUM SERPL-SCNC: 4 MMOL/L (ref 3.5–5.2)
PROT SERPL-MCNC: 6.9 G/DL (ref 6–8.5)
RBC # BLD AUTO: 5.49 10*6/MM3 (ref 4.14–5.8)
SODIUM SERPL-SCNC: 141 MMOL/L (ref 136–145)
TRIGL SERPL-MCNC: 414 MG/DL (ref 0–150)
VLDLC SERPL CALC-MCNC: 70 MG/DL (ref 5–40)
WBC # BLD AUTO: 6.74 10*3/MM3 (ref 3.4–10.8)
WRITTEN AUTHORIZATION: NORMAL

## 2023-03-01 ENCOUNTER — OFFICE VISIT (OUTPATIENT)
Dept: FAMILY MEDICINE CLINIC | Facility: CLINIC | Age: 33
End: 2023-03-01
Payer: COMMERCIAL

## 2023-03-01 VITALS
TEMPERATURE: 96.8 F | HEART RATE: 88 BPM | DIASTOLIC BLOOD PRESSURE: 86 MMHG | SYSTOLIC BLOOD PRESSURE: 133 MMHG | BODY MASS INDEX: 37.31 KG/M2 | OXYGEN SATURATION: 99 % | HEIGHT: 73 IN | WEIGHT: 281.5 LBS

## 2023-03-01 DIAGNOSIS — R73.03 PREDIABETES: ICD-10-CM

## 2023-03-01 DIAGNOSIS — E78.2 MIXED HYPERLIPIDEMIA: ICD-10-CM

## 2023-03-01 DIAGNOSIS — I10 ESSENTIAL HYPERTENSION: Primary | ICD-10-CM

## 2023-03-01 PROCEDURE — 99214 OFFICE O/P EST MOD 30 MIN: CPT | Performed by: NURSE PRACTITIONER

## 2023-03-01 NOTE — PROGRESS NOTES
"Answers for HPI/ROS submitted by the patient on 2/27/2023  What is the primary reason for your visit?: Other  Please describe your symptoms.: Follow up  Have you had these symptoms before?: No  How long have you been having these symptoms?: Greater than 2 weeks    Chief Complaint  lab follow up (Pt wants to discuss his A1C)    Subjective        Bahman Christina presents to [facility]  History of Present Illness     Bahman Christina is a 32-year-old male who presents today for a follow-up on HbA1c. He has consented to ERICH.    He has been doing okay since his last visit. When he lost his job, he got a little off track. His new job is more physical and he is doing [employer]. He bought a punching bag, is golfing a lot more and walks when he does. He has improved his diet by avoiding fried foods and consuming smaller portions. The patient works for a [employer] and walks a lot when working. He asks about dietary supplements.    He is still taking nifedipine and valsartan. He denies any problems with them, and states he will need a refill of nifedipine soon. He does not have a blood pressure cuff at home.    He denies any shortness of breath or cough. He denies chest pain, dizziness, or heart palpitations. He denies any stomach symptoms, nausea, vomiting, or diarrhea.    There is a family history of diabetes mellitus.    The Review of Systems has been reviewed and is negative other than what has been discussed within the HPI.    Objective   Vital Signs:  /86   Pulse 88   Temp 96.8 °F (36 °C) (Temporal)   Ht 185.4 cm (73\")   Wt 128 kg (281 lb 8 oz)   SpO2 99%   BMI 37.14 kg/m²   Estimated body mass index is 37.14 kg/m² as calculated from the following:    Height as of this encounter: 185.4 cm (73\").    Weight as of this encounter: 128 kg (281 lb 8 oz).       Class 2 Severe Obesity (BMI >=35 and <=39.9). Obesity-related health conditions include the following: " hypertension and dyslipidemias. Obesity is unchanged. BMI is is above average; BMI management plan is completed. We discussed portion control and increasing exercise.      Physical Exam  Vitals and nursing note reviewed.   Constitutional:       General: He is not in acute distress.     Appearance: He is well-developed. He is obese. He is not ill-appearing or diaphoretic.   HENT:      Head: Normocephalic and atraumatic.   Eyes:      General:         Right eye: No discharge.         Left eye: No discharge.      Conjunctiva/sclera: Conjunctivae normal.   Cardiovascular:      Rate and Rhythm: Normal rate and regular rhythm.      Heart sounds: Normal heart sounds.   Pulmonary:      Effort: Pulmonary effort is normal.      Breath sounds: Normal breath sounds.   Abdominal:      General: Bowel sounds are normal.      Palpations: Abdomen is soft.      Tenderness: There is no abdominal tenderness.   Musculoskeletal:         General: No deformity.      Comments: Gait smooth and steady   Skin:     General: Skin is warm and dry.   Neurological:      General: No focal deficit present.      Mental Status: He is alert and oriented to person, place, and time.   Psychiatric:         Mood and Affect: Mood normal.         Behavior: Behavior normal.         Thought Content: Thought content normal.      Comments: Very pleasant and conversant        Result Review :          We discussed the patient's laboratory results from 10/17/2022. His HbA1c was 5.7%. His total triglycerides were very high, HDL was improving, and LDL was at goal. His CBC looked okay.         Assessment and Plan   Diagnoses and all orders for this visit:    1. Essential hypertension (Primary)  -     Microalbumin / Creatinine Urine Ratio - Urine, Clean Catch  -     Comprehensive Metabolic Panel  -     TSH Rfx On Abnormal To Free T4    2. Prediabetes  -     Microalbumin / Creatinine Urine Ratio - Urine, Clean Catch  -     Hemoglobin A1c    3. Mixed hyperlipidemia  -      Lipid Panel With LDL / HDL Ratio      1. Prediabetes  - The patient will continue to work on diet and exercise. He prefers to do this over medication, though he is not opposed to taking medicine if he needs to. HbA1c ordered. He was cautioned to increase the amount of protein and decrease carbs. Caution with supplements.     2. Hypertension: Blood pressure is little elevated here today however patient admits he is anxious since he has not been here recently.  - The patient will continue to take valsartan and nifedipine as prescribed. He was encouraged to purchase a blood pressure monitor and check his blood pressure at home periodically. CMP, urine microalbumin/creatinine, and TSH ordered.    3. Hyperlipidemia  - We discussed diet and exercise will be helpful to improve his lipids. Lipid panel ordered.         Follow Up   No follow-ups on file.  Patient was given instructions and counseling regarding his condition or for health maintenance advice. Please see specific information pulled into the AVS if appropriate.       Transcribed from ambient dictation for OBED Canales by Rachel Madrid.  03/01/23   08:59 EST    Patient or patient representative verbalized consent to the visit recording.  I have personally performed the services described in this document as transcribed by the above individual, and it is both accurate and complete.

## 2023-03-02 LAB
ALBUMIN SERPL-MCNC: 4.9 G/DL (ref 3.5–5.2)
ALBUMIN/CREAT UR: 4 MG/G CREAT (ref 0–29)
ALBUMIN/GLOB SERPL: 1.9 G/DL
ALP SERPL-CCNC: 81 U/L (ref 39–117)
ALT SERPL-CCNC: 12 U/L (ref 1–41)
AST SERPL-CCNC: 13 U/L (ref 1–40)
BILIRUB SERPL-MCNC: 0.3 MG/DL (ref 0–1.2)
BUN SERPL-MCNC: 23 MG/DL (ref 6–20)
BUN/CREAT SERPL: 18 (ref 7–25)
CALCIUM SERPL-MCNC: 10.1 MG/DL (ref 8.6–10.5)
CHLORIDE SERPL-SCNC: 107 MMOL/L (ref 98–107)
CHOLEST SERPL-MCNC: 200 MG/DL (ref 0–200)
CO2 SERPL-SCNC: 30 MMOL/L (ref 22–29)
CREAT SERPL-MCNC: 1.28 MG/DL (ref 0.76–1.27)
CREAT UR-MCNC: 130 MG/DL
EGFRCR SERPLBLD CKD-EPI 2021: 77.2 ML/MIN/1.73
GLOBULIN SER CALC-MCNC: 2.6 GM/DL
GLUCOSE SERPL-MCNC: 109 MG/DL (ref 65–99)
HBA1C MFR BLD: 5.6 % (ref 4.8–5.6)
HDLC SERPL-MCNC: 33 MG/DL (ref 40–60)
LDLC SERPL CALC-MCNC: 107 MG/DL (ref 0–100)
LDLC/HDLC SERPL: 2.95 {RATIO}
MICROALBUMIN UR-MCNC: 4.6 UG/ML
POTASSIUM SERPL-SCNC: 4.6 MMOL/L (ref 3.5–5.2)
PROT SERPL-MCNC: 7.5 G/DL (ref 6–8.5)
SODIUM SERPL-SCNC: 145 MMOL/L (ref 136–145)
TRIGL SERPL-MCNC: 348 MG/DL (ref 0–150)
TSH SERPL DL<=0.005 MIU/L-ACNC: 1.84 UIU/ML (ref 0.27–4.2)
VLDLC SERPL CALC-MCNC: 60 MG/DL (ref 5–40)

## 2023-03-17 DIAGNOSIS — I10 ESSENTIAL HYPERTENSION: Chronic | ICD-10-CM

## 2023-03-20 RX ORDER — VALSARTAN 80 MG/1
80 TABLET ORAL DAILY
Qty: 90 TABLET | Refills: 1 | Status: SHIPPED | OUTPATIENT
Start: 2023-03-20

## 2023-03-20 RX ORDER — NIFEDIPINE 60 MG/1
60 TABLET, EXTENDED RELEASE ORAL DAILY
Qty: 90 TABLET | Refills: 1 | Status: SHIPPED | OUTPATIENT
Start: 2023-03-20

## 2023-11-02 DIAGNOSIS — I10 ESSENTIAL HYPERTENSION: Chronic | ICD-10-CM

## 2023-11-02 RX ORDER — NIFEDIPINE 60 MG/1
60 TABLET, EXTENDED RELEASE ORAL DAILY
Qty: 90 TABLET | Refills: 1 | Status: SHIPPED | OUTPATIENT
Start: 2023-11-02

## 2023-11-02 NOTE — TELEPHONE ENCOUNTER
Rx Refill Note  Requested Prescriptions     Pending Prescriptions Disp Refills    NIFEdipine XL (PROCARDIA XL) 60 MG 24 hr tablet [Pharmacy Med Name: NIFEDIPINE ER 60 MG TABLET] 90 tablet 1     Sig: TAKE 1 TABLET BY MOUTH EVERY DAY      Last office visit with prescribing clinician: 3/1/2023   Last telemedicine visit with prescribing clinician: Visit date not found   Next office visit with prescribing clinician: Visit date not found                         Would you like a call back once the refill request has been completed: [] Yes [] No    If the office needs to give you a call back, can they leave a voicemail: [] Yes [] No    Tommy Piper MA  11/02/23, 08:31 EDT

## 2024-11-11 ENCOUNTER — HOSPITAL ENCOUNTER (OUTPATIENT)
Dept: GENERAL RADIOLOGY | Facility: HOSPITAL | Age: 34
Discharge: HOME | End: 2024-11-11
Admitting: NURSE PRACTITIONER
Payer: COMMERCIAL

## 2024-11-11 ENCOUNTER — OFFICE VISIT (OUTPATIENT)
Dept: FAMILY MEDICINE CLINIC | Facility: CLINIC | Age: 34
End: 2024-11-11
Payer: COMMERCIAL

## 2024-11-11 VITALS
HEIGHT: 73 IN | TEMPERATURE: 97.3 F | WEIGHT: 284.1 LBS | BODY MASS INDEX: 37.65 KG/M2 | HEART RATE: 108 BPM | DIASTOLIC BLOOD PRESSURE: 98 MMHG | RESPIRATION RATE: 14 BRPM | SYSTOLIC BLOOD PRESSURE: 146 MMHG | OXYGEN SATURATION: 96 %

## 2024-11-11 DIAGNOSIS — I10 ESSENTIAL HYPERTENSION: Chronic | ICD-10-CM

## 2024-11-11 DIAGNOSIS — R05.2 SUBACUTE COUGH: ICD-10-CM

## 2024-11-11 DIAGNOSIS — R06.83 SNORING: Chronic | ICD-10-CM

## 2024-11-11 DIAGNOSIS — Z00.00 ROUTINE GENERAL MEDICAL EXAMINATION AT A HEALTH CARE FACILITY: ICD-10-CM

## 2024-11-11 DIAGNOSIS — R05.2 SUBACUTE COUGH: Primary | ICD-10-CM

## 2024-11-11 DIAGNOSIS — E66.9 OBESITY (BMI 30-39.9): Chronic | ICD-10-CM

## 2024-11-11 PROCEDURE — 99214 OFFICE O/P EST MOD 30 MIN: CPT | Performed by: NURSE PRACTITIONER

## 2024-11-11 PROCEDURE — 71046 X-RAY EXAM CHEST 2 VIEWS: CPT

## 2024-11-11 RX ORDER — VALSARTAN 80 MG/1
80 TABLET ORAL DAILY
Qty: 90 TABLET | Refills: 1 | Status: SHIPPED | OUTPATIENT
Start: 2024-11-11

## 2024-11-11 NOTE — PROGRESS NOTES
Chief Complaint  Cough (Pt states going on for 2 months )    Subjective        Bahman Christina presents to [facility]  History of Present Illness    History of Present Illness  The patient is a 34-year-old male who presents for evaluation of multiple medical concerns.    He has been experiencing a persistent cough for the past 2 to 3 months, which worsened after a recent bout of strep throat. The cough is intermittent, often occurring at night when he is lying down, and can be triggered by thirst, large meals, or excessive talking. His cough is dry and only becomes productive when he is ill. He has been treated with Z-Paks for his strep throat and has had respiratory infections in 02/2024, 04/2024, and 10/2024. He has not had a chest x-ray. He experiences this cough annually, typically during the summer, but it started earlier and lasted longer this year. He has been using Bromfed cough syrup, which is effective but requires frequent dosing. He also has acid reflux, which he manages with Tums, and has noticed that peppermint gum seems to alleviate his cough. He has no history of asthma but developed allergies [place].    He has high blood pressure, which he manages with medication, although not consistently. He has gained weight due to stress and has stopped his regular workouts. He snores heavily, particularly when sleeping on his right side or back, and has been using a CPAP machine more frequently since his strep throat. He does not take Zyrtec daily.    He has been experiencing dry skin on his face and scalp, which becomes red and flaky after shaving. He uses a humidifier and moisturizes his skin, but the dryness persists. He has tried dandruff shampoo, which provides temporary relief, but the flakes return. He also uses Neutrogena anti-dandruff medicated gel in the shower and applies lotion afterwards, but not daily. The skin becomes itchy as the hair grows  "back.    He was having hearing loss after being hit with a baseball, but it is okay now.      Objective   Vital Signs:  /98   Pulse 108   Temp 97.3 °F (36.3 °C) (Infrared)   Resp 14   Ht 185.4 cm (73\")   Wt 129 kg (284 lb 1.6 oz)   SpO2 96%   BMI 37.48 kg/m²   Estimated body mass index is 37.48 kg/m² as calculated from the following:    Height as of this encounter: 185.4 cm (73\").    Weight as of this encounter: 129 kg (284 lb 1.6 oz).       Class 2 Severe Obesity (BMI >=35 and <=39.9). Obesity-related health conditions include the following: hypertension and GERD. Obesity is unchanged. BMI is is above average; BMI management plan is completed. We discussed portion control, increasing exercise, and Information on healthy weight added to patient's after visit summary.      Physical Exam  Vitals and nursing note reviewed.   Constitutional:       General: He is not in acute distress.     Appearance: He is well-developed. He is not diaphoretic.   HENT:      Head: Normocephalic and atraumatic.      Right Ear: Ear canal and external ear normal. A middle ear effusion is present. Tympanic membrane is not erythematous.      Left Ear: Ear canal and external ear normal. A middle ear effusion is present. Tympanic membrane is not erythematous.      Mouth/Throat:      Mouth: Mucous membranes are moist.      Pharynx: Posterior oropharyngeal erythema present. No oropharyngeal exudate.   Eyes:      General: No scleral icterus.        Right eye: No discharge.         Left eye: No discharge.      Conjunctiva/sclera: Conjunctivae normal.   Cardiovascular:      Rate and Rhythm: Normal rate and regular rhythm.      Heart sounds: Normal heart sounds.   Pulmonary:      Effort: Pulmonary effort is normal.      Breath sounds: Normal breath sounds.   Abdominal:      General: Bowel sounds are normal.      Palpations: Abdomen is soft.      Tenderness: There is no abdominal tenderness.   Musculoskeletal:         General: No " deformity.      Cervical back: Neck supple.      Comments: Gait smooth and steady   Lymphadenopathy:      Cervical: Cervical adenopathy present.   Skin:     General: Skin is warm and dry.   Neurological:      Mental Status: He is alert and oriented to person, place, and time.   Psychiatric:         Mood and Affect: Mood normal.         Behavior: Behavior normal.          Physical Exam       Result Review :            Results                  Assessment and Plan     Diagnoses and all orders for this visit:    1. Subacute cough (Primary)  -     omeprazole (priLOSEC) 20 MG capsule; Take 2 capsules by mouth Daily.  Dispense: 30 capsule; Refill: 1  -     XR chest pa and lateral; Future    2. Essential hypertension    3. Snoring  -     Ambulatory Referral to Sleep Medicine    4. Obesity (BMI 30-39.9)    5. Routine general medical examination at a health care facility  -     CBC & Differential  -     Comprehensive Metabolic Panel  -     Hemoglobin A1c  -     Lipid Panel With LDL / HDL Ratio  -     TSH Rfx On Abnormal To Free T4  -     Microalbumin / Creatinine Urine Ratio - Urine, Clean Catch        Assessment & Plan  1. Cough.  The cough could be attributed to postnasal drip, environmental allergies, or acid reflux. A chest x-ray will be conducted to rule out other potential causes. He is advised to take Zyrtec daily and omeprazole after dinner.     2. Hypertension.  He reports not taking his medication regularly. He is advised to resume taking his blood pressure medication consistently. Fasting labs will be conducted prior to his next visit.    3. Seborrheic dermatitis.  He is advised to use a humidifier in his bedroom and apply CeraVe moisturizer. He should also continue using dandruff shampoo on his face and scalp. He is requested to provide a photograph of the affected area for further evaluation.    4.  Snoring  Referral to sleep medicine for suspected CAMERON    Follow-up  Return in 1 month for follow up-will do  physical and follow up on cough            Follow Up     Return in about 1 month (around 12/11/2024) for Annual physical.  Patient was given instructions and counseling regarding his condition or for health maintenance advice. Please see specific information pulled into the AVS if appropriate.    Patient or patient representative verbalized consent for the use of Ambient Listening during the visit with  OBED Canales for chart documentation. 11/25/2024  07:04 EST

## 2024-11-11 NOTE — TELEPHONE ENCOUNTER
Rx Refill Note  Requested Prescriptions     Pending Prescriptions Disp Refills    valsartan (DIOVAN) 80 MG tablet [Pharmacy Med Name: VALSARTAN 80 MG TABLET] 90 tablet 1     Sig: TAKE 1 TABLET BY MOUTH EVERY DAY      Last office visit with prescribing clinician: 11/11/2024   Last telemedicine visit with prescribing clinician: Visit date not found   Next office visit with prescribing clinician: 12/9/2024                         Would you like a call back once the refill request has been completed: [] Yes [] No    If the office needs to give you a call back, can they leave a voicemail: [] Yes [] No    Edgardo Ortega Rep  11/11/24, 15:43 EST  
ADMIT

## 2024-11-12 RX ORDER — NIFEDIPINE 60 MG/1
60 TABLET, EXTENDED RELEASE ORAL DAILY
Qty: 90 TABLET | Refills: 0 | Status: SHIPPED | OUTPATIENT
Start: 2024-11-12

## 2024-11-12 NOTE — TELEPHONE ENCOUNTER
Rx Refill Note  Requested Prescriptions     Pending Prescriptions Disp Refills    NIFEdipine XL (PROCARDIA XL) 60 MG 24 hr tablet [Pharmacy Med Name: NIFEDIPINE ER 60 MG TABLET] 90 tablet 1     Sig: TAKE 1 TABLET BY MOUTH EVERY DAY      Last office visit with prescribing clinician: Visit date not found   Last telemedicine visit with prescribing clinician: Visit date not found   Next office visit with prescribing clinician: Visit date not found                         Would you like a call back once the refill request has been completed: [] Yes [] No    If the office needs to give you a call back, can they leave a voicemail: [] Yes [] No    Tommy Piper MA  11/12/24, 08:04 EST

## 2024-11-27 ENCOUNTER — TELEPHONE (OUTPATIENT)
Dept: FAMILY MEDICINE CLINIC | Facility: CLINIC | Age: 34
End: 2024-11-27

## 2024-11-27 DIAGNOSIS — R05.2 SUBACUTE COUGH: ICD-10-CM

## 2024-11-27 NOTE — TELEPHONE ENCOUNTER
Caller: Bahman Christina    Relationship: Self    Best call back number: [phone]     Requested Prescriptions:   Requested Prescriptions     Pending Prescriptions Disp Refills    omeprazole (priLOSEC) 20 MG capsule 30 capsule 1     Sig: Take 2 capsules by mouth Daily.        Pharmacy where request should be sent: [id number] [place] [address] - [phone] PH - [phone] FX     Last office visit with prescribing clinician: 11/11/2024   Last telemedicine visit with prescribing clinician: Visit date not found   Next office visit with prescribing clinician: 12/9/2024     Additional details provided by patient: PATIENT IS OUT OF MEDICATION     Does the patient have less than a 3 day supply:  [x] Yes  [] No    Would you like a call back once the refill request has been completed: [] Yes [x] No    If the office needs to give you a call back, can they leave a voicemail: [] Yes [x] No    Edgardo Brand Rep   11/27/24 08:07 EST

## 2024-11-27 NOTE — TELEPHONE ENCOUNTER
Caller: Bahman Christina    Relationship: Self    Best call back number: [phone]     What was the call regarding: PATIENT STATES HE HAD A REFERRAL PUT IN FOR A SLEEP APNEA SPECIALIST BUT HE RECEIVED A LETTER FROM HIS INSURANCE STATING THEY WILL BE DENYING HIS COVERAGE. PATIENT WOULD LIKE TO KNOW WHAT BRIA CARMEN RECOMMENDS HIM TO DO     PLEASE CALL AND ADVISE

## 2024-11-27 NOTE — TELEPHONE ENCOUNTER
Rx Refill Note  Requested Prescriptions     Pending Prescriptions Disp Refills    omeprazole (priLOSEC) 20 MG capsule 90 capsule 1     Sig: Take 2 capsules by mouth Daily.      Last office visit with prescribing clinician: 11/11/2024   Last telemedicine visit with prescribing clinician: Visit date not found   Next office visit with prescribing clinician: 11/27/2024                         Would you like a call back once the refill request has been completed: [] Yes [] No    If the office needs to give you a call back, can they leave a voicemail: [] Yes [] No    Edgardo Ortega Rep  11/27/24, 08:13 EST

## 2024-12-09 ENCOUNTER — OFFICE VISIT (OUTPATIENT)
Dept: FAMILY MEDICINE CLINIC | Facility: CLINIC | Age: 34
End: 2024-12-09
Payer: COMMERCIAL

## 2024-12-09 VITALS
OXYGEN SATURATION: 99 % | BODY MASS INDEX: 37.11 KG/M2 | SYSTOLIC BLOOD PRESSURE: 130 MMHG | WEIGHT: 280 LBS | TEMPERATURE: 97.1 F | HEART RATE: 97 BPM | DIASTOLIC BLOOD PRESSURE: 90 MMHG | HEIGHT: 73 IN

## 2024-12-09 DIAGNOSIS — Z00.00 ROUTINE GENERAL MEDICAL EXAMINATION AT A HEALTH CARE FACILITY: Primary | ICD-10-CM

## 2024-12-09 DIAGNOSIS — E66.01 CLASS 2 SEVERE OBESITY WITH SERIOUS COMORBIDITY AND BODY MASS INDEX (BMI) OF 36.0 TO 36.9 IN ADULT, UNSPECIFIED OBESITY TYPE: ICD-10-CM

## 2024-12-09 DIAGNOSIS — L30.9 DERMATITIS: ICD-10-CM

## 2024-12-09 DIAGNOSIS — E66.812 CLASS 2 SEVERE OBESITY WITH SERIOUS COMORBIDITY AND BODY MASS INDEX (BMI) OF 36.0 TO 36.9 IN ADULT, UNSPECIFIED OBESITY TYPE: ICD-10-CM

## 2024-12-09 DIAGNOSIS — R06.83 SNORING: ICD-10-CM

## 2024-12-09 DIAGNOSIS — K21.9 GASTROESOPHAGEAL REFLUX DISEASE, UNSPECIFIED WHETHER ESOPHAGITIS PRESENT: ICD-10-CM

## 2024-12-09 PROCEDURE — 99395 PREV VISIT EST AGE 18-39: CPT | Performed by: NURSE PRACTITIONER

## 2024-12-09 NOTE — PROGRESS NOTES
Chief Complaint  Cough (1 month f/u) and Annual Exam    Subjective        Bahman Christina presents to [facility]  History of Present Illness    History of Present Illness  The patient presents for physical and evaluation of multiple medical concerns.    He reports that his insurance denied coverage for a sleep study, which was scheduled after his last visit. He experiences daytime fatigue due to his physically demanding job but does not feel excessively sleepy. He has noticed an increase in snoring, which he attributes to weather changes. Use of a humidifier has somewhat improved this. His partner has observed few instances of him stopping breathing during sleep. He has been maintaining his weight for the past 6 to 8 months, even though he has not been exercising as much as he would like. He is currently trying to regain his fitness for baseball starting in spring.    He has a history of dental issues and has experienced cycles of weight loss and gain. He does not monitor his blood pressure at home. He reports no unusual fatigue related to heart issues. Omeprazole has been effective for his cough, which is now infrequent and does not require additional medication. He reports no shortness of breath, chest pain, or heart palpitations. He has good activity tolerance and reports no gastrointestinal issues, including nausea, vomiting, diarrhea, or changes in stool characteristics. He also reports no urinary issues, testicular pain, swelling, unexpected loss of libido, or erectile dysfunction. He has been taking omeprazole 40 mg regularly since his last visit and has not tried the 20 mg dose.    He has suspected psoriasis, which causes flakiness in his beard and redness on his chin. The redness subsides after a few days but can become severe. He uses lotion and Neutrogena for the flakiness and applies lotion frequently at work and home.  He also uses head and shoulders shampoo on his beard  "without any improvement.  He has not consulted a dermatologist. The condition started when he was 20 or 21 years old when his job required him to shave with a straight razor. He initially thought it was irritation from shaving cream, but the condition worsened over time. He no longer shaves with a straight razor and uses a beard olga instead.    He reports that he got hit in the ear with a baseball, which caused a rupture left TM. He went to urgent care and then saw ENT, Dr. Hema Kaur. He experienced hearing loss for 2 months, which then resolved.    Thinks his blood pressure is well-controlled with current medication but does not check at home regularly but could.  Denies side effect of medication.  SHAUN-7 Score: SHAUN 7 Total Score: 1  PHQ-9 Total Score: 1       Objective   Vital Signs:  /90   Pulse 97   Temp 97.1 °F (36.2 °C) (Temporal)   Ht 185.4 cm (73\")   Wt 127 kg (280 lb)   SpO2 99%   BMI 36.94 kg/m²   Estimated body mass index is 36.94 kg/m² as calculated from the following:    Height as of this encounter: 185.4 cm (73\").    Weight as of this encounter: 127 kg (280 lb).               Physical Exam  Vitals and nursing note reviewed.   Constitutional:       General: He is not in acute distress.     Appearance: He is well-developed. He is not ill-appearing.   HENT:      Head: Normocephalic and atraumatic.      Right Ear: Tympanic membrane, ear canal and external ear normal.      Left Ear: Ear canal and external ear normal.      Mouth/Throat:      Mouth: Mucous membranes are moist.      Pharynx: Uvula midline. No posterior oropharyngeal erythema.      Tonsils: No tonsillar exudate. 3+ on the right. 3+ on the left.   Eyes:      General: No scleral icterus.        Right eye: No discharge.         Left eye: No discharge.      Conjunctiva/sclera: Conjunctivae normal.      Pupils: Pupils are equal, round, and reactive to light.   Neck:      Thyroid: No thyromegaly.   Cardiovascular:      Rate and " Rhythm: Normal rate and regular rhythm.      Heart sounds: Normal heart sounds.   Pulmonary:      Effort: Pulmonary effort is normal.      Breath sounds: Normal breath sounds.   Abdominal:      General: Bowel sounds are normal. There is no distension.      Palpations: Abdomen is soft.      Tenderness: There is no abdominal tenderness.   Musculoskeletal:         General: No deformity.      Cervical back: Neck supple.      Comments: Gait smooth and steady   Lymphadenopathy:      Cervical: No cervical adenopathy.   Skin:     General: Skin is warm and dry.   Neurological:      General: No focal deficit present.      Mental Status: He is alert and oriented to person, place, and time.   Psychiatric:         Mood and Affect: Mood normal.         Behavior: Behavior normal.         Thought Content: Thought content normal.          Physical Exam       Result Review :            Results                  Assessment and Plan     Diagnoses and all orders for this visit:    1. Routine general medical examination at a health care facility (Primary)    2. Gastroesophageal reflux disease, unspecified whether esophagitis present    3. Class 2 severe obesity with serious comorbidity and body mass index (BMI) of 36.0 to 36.9 in adult, unspecified obesity type    4. Snoring  -     Ambulatory Referral to ENT (Otolaryngology)    5. Dermatitis  -     Ambulatory Referral to Dermatology    Other orders  -     ketoconazole (NIZORAL) 2 % shampoo; Apply  topically to the appropriate area as directed 2 (Two) Times a Week. Leave on for 5 mins prior to rinsing  Dispense: 120 mL; Refill: 0        Assessment & Plan  1. Suspected Sleep Apnea.  His tonsils are notably enlarged, particularly when he is in a supine position. This may not necessarily indicate sleep apnea but rather a physical constriction due to the size of these structures. He has experienced witnessed apneas as reported by his partner. A referral to an ENT specialist will be made for  further evaluation. He is advised to monitor his blood pressure at home a few times a month at varying times to ensure the effectiveness of his current medication.    2.  Dermatitis-seborrheic  He experiences redness and flakiness, particularly in the beard area, which can become itchy and severe. He currently uses lotion and Neutrogena for the flakiness but continues to experience symptoms. A referral to a dermatologist will be made for specialized care to prevent potential skin infections.  Can try ketoconazole in the meantime.    3. Gastroesophageal Reflux Disease (GERD).  He reports significant improvement in his cough and acid reflux symptoms with the use of omeprazole. He is advised to reduce his omeprazole dosage to 20 mg and report any changes in symptoms.    4.  Hypertension.   Diastolic blood pressure is little elevated today here.  Recommend checking blood pressures at home couple times per week various times and let me know how his blood pressure is running.    5.  Obesity  Discussed diet, exercise modifications.  Patient still feels he is able to drop weight pretty quickly with more exercise and attention to diet and plans to do so to get ready for baseball.            Follow Up     No follow-ups on file.  Patient was given instructions and counseling regarding his condition or for health maintenance advice. Please see specific information pulled into the AVS if appropriate.    Patient or patient representative verbalized consent for the use of Ambient Listening during the visit with  OBED Canales for chart documentation. 12/10/2024  07:50 EST

## 2024-12-10 RX ORDER — KETOCONAZOLE 20 MG/ML
SHAMPOO, SUSPENSION TOPICAL 2 TIMES WEEKLY
Qty: 120 ML | Refills: 0 | Status: SHIPPED | OUTPATIENT
Start: 2024-12-12

## 2025-01-06 DIAGNOSIS — L30.9 DERMATITIS: ICD-10-CM

## 2025-01-06 DIAGNOSIS — R05.2 SUBACUTE COUGH: ICD-10-CM

## 2025-01-07 NOTE — TELEPHONE ENCOUNTER
Rx Refill Note  Requested Prescriptions     Pending Prescriptions Disp Refills    ketoconazole (NIZORAL) 2 % shampoo [Pharmacy Med Name: KETOCONAZOLE 2% SHAMPOO] 120 mL 0     Sig: APPLY TOPICALLY TO THE APPROPRIATE AREA AS DIRECTED 2 (TWO) TIMES A WEEK. LEAVE ON FOR 5 MINS PRIOR TO RINSING    omeprazole (priLOSEC) 20 MG capsule [Pharmacy Med Name: OMEPRAZOLE DR 20 MG CAPSULE] 180 capsule 1     Sig: TAKE 2 CAPSULES BY MOUTH EVERY DAY      Last office visit with prescribing clinician: 12/9/2024   Last telemedicine visit with prescribing clinician: Visit date not found   Next office visit with prescribing clinician: Visit date not found                         Would you like a call back once the refill request has been completed: [] Yes [] No    If the office needs to give you a call back, can they leave a voicemail: [] Yes [] No    Edgardo Ortega Rep  01/07/25, 13:27 EST

## 2025-01-08 RX ORDER — KETOCONAZOLE 20 MG/ML
SHAMPOO, SUSPENSION TOPICAL 2 TIMES WEEKLY
Qty: 120 ML | Refills: 0 | Status: SHIPPED | OUTPATIENT
Start: 2025-01-09

## 2025-01-16 DIAGNOSIS — Z20.2 EXPOSURE TO TRICHOMONAS: Primary | ICD-10-CM

## 2025-01-21 ENCOUNTER — TRANSCRIBE ORDERS (OUTPATIENT)
Dept: SLEEP MEDICINE | Facility: HOSPITAL | Age: 35
End: 2025-01-21
Payer: COMMERCIAL

## 2025-01-21 DIAGNOSIS — R06.83 SNORING: Primary | ICD-10-CM

## 2025-01-27 ENCOUNTER — LAB (OUTPATIENT)
Dept: LAB | Facility: HOSPITAL | Age: 35
End: 2025-01-27
Payer: COMMERCIAL

## 2025-01-27 PROCEDURE — 87661 TRICHOMONAS VAGINALIS AMPLIF: CPT | Performed by: NURSE PRACTITIONER

## 2025-02-05 DIAGNOSIS — I10 ESSENTIAL HYPERTENSION: Chronic | ICD-10-CM

## 2025-02-05 NOTE — TELEPHONE ENCOUNTER
Rx Refill Note  Requested Prescriptions     Pending Prescriptions Disp Refills    NIFEdipine XL (PROCARDIA XL) 60 MG 24 hr tablet [Pharmacy Med Name: NIFEDIPINE ER 60 MG TABLET] 90 tablet 1     Sig: TAKE 1 TABLET BY MOUTH EVERY DAY      Last office visit with prescribing clinician: 12/9/2024   Last telemedicine visit with prescribing clinician: Visit date not found   Next office visit with prescribing clinician: Visit date not found                         Would you like a call back once the refill request has been completed: [] Yes [] No    If the office needs to give you a call back, can they leave a voicemail: [] Yes [] No    Seth Galloway  02/05/25, 09:07 EST

## 2025-02-06 DIAGNOSIS — L30.9 DERMATITIS: ICD-10-CM

## 2025-02-06 NOTE — TELEPHONE ENCOUNTER
Rx Refill Note  Requested Prescriptions     Pending Prescriptions Disp Refills    ketoconazole (NIZORAL) 2 % shampoo [Pharmacy Med Name: KETOCONAZOLE 2% SHAMPOO] 120 mL 0     Sig: APPLY TOPICALLY TO THE APPROPRIATE AREA AS DIRECTED 2 (TWO) TIMES A WEEK. LEAVE ON FOR 5 MINS PRIOR TO RINSING      Last office visit with prescribing clinician: 12/9/2024   Last telemedicine visit with prescribing clinician: Visit date not found   Next office visit with prescribing clinician: Visit date not found                         Would you like a call back once the refill request has been completed: [] Yes [] No    If the office needs to give you a call back, can they leave a voicemail: [] Yes [] No    Edgardo Ortega Rep  02/06/25, 09:14 EST

## 2025-02-07 RX ORDER — KETOCONAZOLE 20 MG/ML
SHAMPOO, SUSPENSION TOPICAL 2 TIMES WEEKLY
Qty: 120 ML | Refills: 0 | Status: SHIPPED | OUTPATIENT
Start: 2025-02-10

## 2025-02-07 RX ORDER — NIFEDIPINE 60 MG/1
60 TABLET, EXTENDED RELEASE ORAL DAILY
Qty: 90 TABLET | Refills: 1 | Status: SHIPPED | OUTPATIENT
Start: 2025-02-07

## 2025-02-28 ENCOUNTER — HOSPITAL ENCOUNTER (OUTPATIENT)
Dept: SLEEP MEDICINE | Facility: HOSPITAL | Age: 35
Discharge: HOME | End: 2025-02-28
Admitting: INTERNAL MEDICINE
Payer: COMMERCIAL

## 2025-02-28 DIAGNOSIS — R06.83 SNORING: ICD-10-CM

## 2025-02-28 PROCEDURE — G0399 HOME SLEEP TEST/TYPE 3 PORTA: HCPCS

## 2025-03-04 ENCOUNTER — TELEPHONE (OUTPATIENT)
Dept: SLEEP MEDICINE | Facility: HOSPITAL | Age: 35
End: 2025-03-04
Payer: COMMERCIAL

## 2025-05-28 DIAGNOSIS — I10 ESSENTIAL HYPERTENSION: Chronic | ICD-10-CM

## 2025-05-28 NOTE — TELEPHONE ENCOUNTER
Rx Refill Note  Requested Prescriptions     Pending Prescriptions Disp Refills    valsartan (DIOVAN) 80 MG tablet [Pharmacy Med Name: VALSARTAN 80 MG TABLET] 90 tablet 1     Sig: TAKE 1 TABLET BY MOUTH EVERY DAY      Last office visit with prescribing clinician: 12/9/2024   Last telemedicine visit with prescribing clinician: Visit date not found   Next office visit with prescribing clinician: Visit date not found                         Would you like a call back once the refill request has been completed: [] Yes [] No    If the office needs to give you a call back, can they leave a voicemail: [] Yes [] No    Love Duke MA  05/28/25, 08:08 EDT

## 2025-05-29 RX ORDER — VALSARTAN 80 MG/1
80 TABLET ORAL DAILY
Qty: 30 TABLET | Refills: 0 | Status: SHIPPED | OUTPATIENT
Start: 2025-05-29

## 2025-05-29 NOTE — TELEPHONE ENCOUNTER
Please contact patient, medication was refilled, but needs to get his labs done for further refill.  He did not get them done at his physical.  I cannot keep refilling his blood pressure medicine without checking labs-SW

## 2025-06-11 ENCOUNTER — TELEPHONE (OUTPATIENT)
Dept: FAMILY MEDICINE CLINIC | Facility: CLINIC | Age: 35
End: 2025-06-11

## 2025-06-11 NOTE — TELEPHONE ENCOUNTER
Hub staff attempted to follow warm transfer process and was unsuccessful     Caller: Bahman Christina    Relationship to patient: Self    Best call back number: [phone]     Patient is needing: PATIENT IS NEEDING TO SCHEDULE FASTING LABS ONE WEEK PRIOR TO HIS SCHEDULED APPOINTMENT. PATIENT STATES THE ONLY DAY HE CAN COME IN WOULD BE ON A MONDAY.     PLEASE CALL TO SCHEDULE

## 2025-06-24 LAB
ALBUMIN SERPL-MCNC: 4.4 G/DL (ref 3.5–5.2)
ALBUMIN/GLOB SERPL: 2 G/DL
ALP SERPL-CCNC: 86 U/L (ref 39–117)
ALT SERPL-CCNC: 11 U/L (ref 1–41)
AST SERPL-CCNC: 17 U/L (ref 1–40)
BASOPHILS # BLD AUTO: 0.09 10*3/MM3 (ref 0–0.2)
BASOPHILS NFR BLD AUTO: 1.4 % (ref 0–1.5)
BILIRUB SERPL-MCNC: 0.4 MG/DL (ref 0–1.2)
BUN SERPL-MCNC: 15 MG/DL (ref 6–20)
BUN/CREAT SERPL: 13 (ref 7–25)
CALCIUM SERPL-MCNC: 9.3 MG/DL (ref 8.6–10.5)
CHLORIDE SERPL-SCNC: 105 MMOL/L (ref 98–107)
CHOLEST SERPL-MCNC: 185 MG/DL (ref 0–200)
CO2 SERPL-SCNC: 26.1 MMOL/L (ref 22–29)
CREAT SERPL-MCNC: 1.15 MG/DL (ref 0.76–1.27)
EGFRCR SERPLBLD CKD-EPI 2021: 86.7 ML/MIN/1.73
EOSINOPHIL # BLD AUTO: 0.29 10*3/MM3 (ref 0–0.4)
EOSINOPHIL NFR BLD AUTO: 4.4 % (ref 0.3–6.2)
ERYTHROCYTE [DISTWIDTH] IN BLOOD BY AUTOMATED COUNT: 13.2 % (ref 12.3–15.4)
GLOBULIN SER CALC-MCNC: 2.2 GM/DL
GLUCOSE SERPL-MCNC: 115 MG/DL (ref 65–99)
HBA1C MFR BLD: 5.5 % (ref 4.8–5.6)
HCT VFR BLD AUTO: 47 % (ref 37.5–51)
HDLC SERPL-MCNC: 30 MG/DL (ref 40–60)
HGB BLD-MCNC: 15.2 G/DL (ref 13–17.7)
IMM GRANULOCYTES # BLD AUTO: 0.04 10*3/MM3 (ref 0–0.05)
IMM GRANULOCYTES NFR BLD AUTO: 0.6 % (ref 0–0.5)
LDLC SERPL CALC-MCNC: 91 MG/DL (ref 0–100)
LDLC/HDLC SERPL: 2.61 {RATIO}
LYMPHOCYTES # BLD AUTO: 2.58 10*3/MM3 (ref 0.7–3.1)
LYMPHOCYTES NFR BLD AUTO: 39.6 % (ref 19.6–45.3)
MCH RBC QN AUTO: 28.7 PG (ref 26.6–33)
MCHC RBC AUTO-ENTMCNC: 32.3 G/DL (ref 31.5–35.7)
MCV RBC AUTO: 88.8 FL (ref 79–97)
MONOCYTES # BLD AUTO: 1.05 10*3/MM3 (ref 0.1–0.9)
MONOCYTES NFR BLD AUTO: 16.1 % (ref 5–12)
NEUTROPHILS # BLD AUTO: 2.47 10*3/MM3 (ref 1.7–7)
NEUTROPHILS NFR BLD AUTO: 37.9 % (ref 42.7–76)
NRBC BLD AUTO-RTO: 0 /100 WBC (ref 0–0.2)
PLATELET # BLD AUTO: 329 10*3/MM3 (ref 140–450)
POTASSIUM SERPL-SCNC: 3.9 MMOL/L (ref 3.5–5.2)
PROT SERPL-MCNC: 6.6 G/DL (ref 6–8.5)
RBC # BLD AUTO: 5.29 10*6/MM3 (ref 4.14–5.8)
SODIUM SERPL-SCNC: 142 MMOL/L (ref 136–145)
TRIGL SERPL-MCNC: 384 MG/DL (ref 0–150)
TSH SERPL DL<=0.005 MIU/L-ACNC: 1.55 UIU/ML (ref 0.27–4.2)
UNABLE TO VOID: NORMAL
VLDLC SERPL CALC-MCNC: 64 MG/DL (ref 5–40)
WBC # BLD AUTO: 6.52 10*3/MM3 (ref 3.4–10.8)

## 2025-07-06 DIAGNOSIS — I10 ESSENTIAL HYPERTENSION: Chronic | ICD-10-CM

## 2025-07-07 RX ORDER — VALSARTAN 80 MG/1
80 TABLET ORAL DAILY
Qty: 90 TABLET | Refills: 1 | Status: SHIPPED | OUTPATIENT
Start: 2025-07-07

## 2025-07-07 NOTE — TELEPHONE ENCOUNTER
Rx Refill Note  Requested Prescriptions     Pending Prescriptions Disp Refills    valsartan (DIOVAN) 80 MG tablet [Pharmacy Med Name: VALSARTAN 80 MG TABLET] 90 tablet 1     Sig: TAKE 1 TABLET BY MOUTH EVERY DAY      Last office visit with prescribing clinician: 12/9/2024   Last telemedicine visit with prescribing clinician: Visit date not found   Next office visit with prescribing clinician: 7/9/2025                         Would you like a call back once the refill request has been completed: [] Yes [] No    If the office needs to give you a call back, can they leave a voicemail: [] Yes [] No    Edgardo Ortega Rep  07/07/25, 09:44 EDT

## 2025-07-09 ENCOUNTER — OFFICE VISIT (OUTPATIENT)
Dept: FAMILY MEDICINE CLINIC | Facility: CLINIC | Age: 35
End: 2025-07-09
Payer: COMMERCIAL

## 2025-07-09 VITALS
HEART RATE: 83 BPM | OXYGEN SATURATION: 96 % | HEIGHT: 73 IN | SYSTOLIC BLOOD PRESSURE: 132 MMHG | BODY MASS INDEX: 38.37 KG/M2 | TEMPERATURE: 97.1 F | WEIGHT: 289.5 LBS | DIASTOLIC BLOOD PRESSURE: 80 MMHG

## 2025-07-09 DIAGNOSIS — E78.2 MIXED HYPERLIPIDEMIA: Chronic | ICD-10-CM

## 2025-07-09 DIAGNOSIS — E66.812 CLASS 2 OBESITY WITH ALVEOLAR HYPOVENTILATION WITHOUT SERIOUS COMORBIDITY WITH BODY MASS INDEX (BMI) OF 38.0 TO 38.9 IN ADULT: Chronic | ICD-10-CM

## 2025-07-09 DIAGNOSIS — G47.33 OSA (OBSTRUCTIVE SLEEP APNEA): Chronic | ICD-10-CM

## 2025-07-09 DIAGNOSIS — L30.9 DERMATITIS: Chronic | ICD-10-CM

## 2025-07-09 DIAGNOSIS — E66.2 CLASS 2 OBESITY WITH ALVEOLAR HYPOVENTILATION WITHOUT SERIOUS COMORBIDITY WITH BODY MASS INDEX (BMI) OF 38.0 TO 38.9 IN ADULT: Chronic | ICD-10-CM

## 2025-07-09 DIAGNOSIS — R73.9 HYPERGLYCEMIA: Primary | ICD-10-CM

## 2025-07-09 PROCEDURE — 99214 OFFICE O/P EST MOD 30 MIN: CPT | Performed by: NURSE PRACTITIONER

## 2025-07-09 NOTE — PROGRESS NOTES
"Chief Complaint  Results (/)    Subjective        Bahman Christina presents to [facility]  History of Present Illness    History of Present Illness  The patient presents for a review of lab results, weight management, sleep apnea, and elevated blood pressure.    He has been making dietary changes, including reducing sugar intake and increasing consumption of sparkling water. He occasionally consumes zero-sugar soda and beer. His girlfriend bakes cookies, brownies, and cakes, which he eats if they are available but does not crave them. His girlfriend has transitioned to using monk fruit as a sweetener. He has noticed a slight improvement in his overall health. He continues to play baseball twice a week and enjoys sports more than gym workouts. He is considering joining a football league. He recently got promoted at work and is working on finding a routine that he enjoys.    He underwent a sleep study and was diagnosed with sleep apnea. He did not follow up due to the high cost of the study and is currently on a payment plan. His snoring has improved, especially when he sleeps on his left side. He is not using CPAP.    He reports no mental or physical health concerns. He was concerned about his blood work results due to a previous A1c reading but was relieved to find that all results were within normal limits. He has not been successful in losing weight and has actually gained some.    He was told that his blood pressure was slightly elevated at 132/80.    He has been trying to conceive with his girlfriend for 8 years and is planning to use an egg donor next month. This process has been stressful for him.  SHAUN-7 Score: SHAUN 7 Total Score: 1  PHQ-9 Total Score: 5    SOCIAL HISTORY  He drinks alcohol occasionally, typically a beer once in a while at dinner.       Objective   Vital Signs:  /80   Pulse 83   Temp 97.1 °F (36.2 °C) (Infrared)   Ht 185.4 cm (73\")   Wt 131 kg (289 lb 8 " "oz)   SpO2 96%   BMI 38.19 kg/m²   Estimated body mass index is 38.19 kg/m² as calculated from the following:    Height as of this encounter: 185.4 cm (73\").    Weight as of this encounter: 131 kg (289 lb 8 oz).               Physical Exam  Vitals and nursing note reviewed.   Constitutional:       General: He is not in acute distress.     Appearance: He is well-developed. He is not ill-appearing.   HENT:      Head: Normocephalic and atraumatic.      Mouth/Throat:      Pharynx: Uvula midline.   Eyes:      General:         Right eye: No discharge.         Left eye: No discharge.      Conjunctiva/sclera: Conjunctivae normal.      Pupils: Pupils are equal, round, and reactive to light.   Neck:      Thyroid: No thyromegaly.   Cardiovascular:      Rate and Rhythm: Normal rate and regular rhythm.      Heart sounds: Normal heart sounds.   Pulmonary:      Effort: Pulmonary effort is normal.      Breath sounds: Normal breath sounds.   Abdominal:      General: Bowel sounds are normal.      Palpations: Abdomen is soft.   Musculoskeletal:         General: No deformity.      Cervical back: Neck supple.      Comments: Gait smooth and steady   Lymphadenopathy:      Cervical: No cervical adenopathy.   Skin:     General: Skin is warm and dry.   Neurological:      General: No focal deficit present.      Mental Status: He is alert and oriented to person, place, and time.   Psychiatric:         Mood and Affect: Mood normal.         Behavior: Behavior normal.          Physical Exam       Result Review :            Results  Labs   - Thyroid levels: Normal   - CBC: Normal   - Glucose: Slightly elevated   - Kidney function: Normal, signs of dehydration   - Electrolytes: Normal   - Liver function: Trending downward   - Hemoglobin A1c: 5.5   - Total cholesterol: 185   - Triglycerides: Elevated   - HDL: 30   - LDL: 107    Diagnostic Testing   - Sleep study: Moderate CAMERON and very severe obstructive sleep apnea when lying on back reviewed and " discussed study results with patient                Assessment and Plan     Diagnoses and all orders for this visit:    1. Hyperglycemia (Primary)    2. Mixed hyperlipidemia    3. Class 2 obesity with alveolar hypoventilation without serious comorbidity with body mass index (BMI) of 38.0 to 38.9 in adult    4. CAMERON (obstructive sleep apnea)    5. Dermatitis  -     ketoconazole (NIZORAL) 2 % shampoo; Apply  topically to the appropriate area as directed 2 (Two) Times a Week. Leave on for 5 mins prior to rinsing  Dispense: 120 mL; Refill: 0        Assessment & Plan  1. Elevated triglycerides.  - Triglyceride levels remain elevated but have shown improvement.  - Advised to continue reducing sugar intake, avoid processed foods, and maintain regular exercise.  - Recheck of labs will be conducted in approximately 6 months.  - Discussed the importance of lifestyle modifications to further reduce triglyceride levels.    2. Sleep apnea.  - Diagnosed with moderate obstructive sleep apnea (CAMERON) and very severe CAMERON when lying on his back.  - Advised to consider using a CPAP machine and to avoid sleeping on his back.  - Weight loss recommended as it can help alleviate symptoms.  - Discussed alternative options such as positional therapy and mouth taping.    3. Weight management-BMI 38.19  - Current weight is 289 pounds.  - Weight loss goal of 20 pounds over the next 6 months set.  - Encouraged to engage in physical activities that he enjoys, such as playing basketball or pickleball, for at least two additional days per week.  - Emphasized the importance of making sustainable lifestyle changes for long-term weight management.  - High-protein and lower carb diet discussed    4. Elevated blood pressure.  - Blood pressure readings have improved, currently at 132/80 mmHg.  - Continued monitoring and lifestyle modifications, including diet and exercise, recommended.  - Discussed the positive impact of reduced sugar intake and regular  physical activity on blood pressure control.    5. Health maintenance.  - Recent lab results are satisfactory, with the exception of slightly elevated glucose levels.  - Hemoglobin A1c has decreased from 5.7 to 5.5, indicating progress towards better glycemic control.  - Total cholesterol is within the desired range, but HDL is below the target of 60, currently at 30.  - LDL is at goal, below 100, currently at 107.  - Plan to recheck labs in 6 months to monitor progress and adjust treatment as needed.            Follow Up     No follow-ups on file.  Patient was given instructions and counseling regarding his condition or for health maintenance advice. Please see specific information pulled into the AVS if appropriate.    Patient or patient representative verbalized consent for the use of Ambient Listening during the visit with  OBED Canales for chart documentation. 7/23/2025  06:47 EDT

## 2025-07-11 DIAGNOSIS — R05.2 SUBACUTE COUGH: ICD-10-CM

## 2025-07-11 NOTE — TELEPHONE ENCOUNTER
Rx Refill Note  Requested Prescriptions     Pending Prescriptions Disp Refills    omeprazole (priLOSEC) 20 MG capsule [Pharmacy Med Name: OMEPRAZOLE DR 20 MG CAPSULE] 180 capsule 1     Sig: TAKE 2 CAPSULES BY MOUTH EVERY DAY      Last office visit with prescribing clinician: 7/9/2025   Last telemedicine visit with prescribing clinician: Visit date not found   Next office visit with prescribing clinician: Visit date not found                         Would you like a call back once the refill request has been completed: [] Yes [] No    If the office needs to give you a call back, can they leave a voicemail: [] Yes [] No    Edgardo Ortega Rep  07/11/25, 08:50 EDT

## 2025-07-14 RX ORDER — OMEPRAZOLE 20 MG/1
40 CAPSULE, DELAYED RELEASE ORAL DAILY
Qty: 180 CAPSULE | Refills: 1 | Status: SHIPPED | OUTPATIENT
Start: 2025-07-14

## 2025-07-17 DIAGNOSIS — L30.9 DERMATITIS: ICD-10-CM

## 2025-07-17 RX ORDER — KETOCONAZOLE 20 MG/ML
SHAMPOO, SUSPENSION TOPICAL 2 TIMES WEEKLY
Qty: 120 ML | Refills: 0 | Status: SHIPPED | OUTPATIENT
Start: 2025-07-17

## 2025-07-17 NOTE — TELEPHONE ENCOUNTER
Rx Refill Note  Requested Prescriptions     Pending Prescriptions Disp Refills    ketoconazole (NIZORAL) 2 % shampoo [Pharmacy Med Name: KETOCONAZOLE 2% SHAMPOO] 120 mL 0     Sig: APPLY TOPICALLY TO THE APPROPRIATE AREA AS DIRECTED 2 (TWO) TIMES A WEEK. LEAVE ON FOR 5 MINS PRIOR TO RINSING      Last office visit with prescribing clinician: 7/9/2025   Last telemedicine visit with prescribing clinician: Visit date not found   Next office visit with prescribing clinician: Visit date not found                         Would you like a call back once the refill request has been completed: [] Yes [] No    If the office needs to give you a call back, can they leave a voicemail: [] Yes [] No    Edgardo Ortega Rep  07/17/25, 16:33 EDT

## 2025-07-23 PROBLEM — G47.33 OSA (OBSTRUCTIVE SLEEP APNEA): Status: ACTIVE | Noted: 2025-07-23

## 2025-07-23 RX ORDER — KETOCONAZOLE 20 MG/ML
SHAMPOO, SUSPENSION TOPICAL 2 TIMES WEEKLY
Qty: 120 ML | Refills: 0 | Status: SHIPPED | OUTPATIENT
Start: 2025-07-24

## 2025-07-27 DIAGNOSIS — I10 ESSENTIAL HYPERTENSION: Chronic | ICD-10-CM

## 2025-07-28 RX ORDER — NIFEDIPINE 60 MG/1
60 TABLET, EXTENDED RELEASE ORAL DAILY
Qty: 90 TABLET | Refills: 1 | Status: SHIPPED | OUTPATIENT
Start: 2025-07-28

## 2025-07-28 NOTE — TELEPHONE ENCOUNTER
Rx Refill Note  Requested Prescriptions     Pending Prescriptions Disp Refills    NIFEdipine XL (PROCARDIA XL) 60 MG 24 hr tablet [Pharmacy Med Name: NIFEDIPINE ER 60 MG TABLET] 90 tablet 1     Sig: TAKE 1 TABLET BY MOUTH EVERY DAY      Last office visit with prescribing clinician: 7/9/2025   Last telemedicine visit with prescribing clinician: Visit date not found   Next office visit with prescribing clinician: Visit date not found                         Would you like a call back once the refill request has been completed: [] Yes [] No    If the office needs to give you a call back, can they leave a voicemail: [] Yes [] No    Edgardo Ortega Rep  07/28/25, 08:48 EDT